# Patient Record
Sex: FEMALE | Race: ASIAN | NOT HISPANIC OR LATINO | URBAN - METROPOLITAN AREA
[De-identification: names, ages, dates, MRNs, and addresses within clinical notes are randomized per-mention and may not be internally consistent; named-entity substitution may affect disease eponyms.]

---

## 2023-10-12 ENCOUNTER — EMERGENCY (EMERGENCY)
Facility: HOSPITAL | Age: 23
LOS: 1 days | Discharge: HOME | End: 2023-10-12
Attending: EMERGENCY MEDICINE | Admitting: EMERGENCY MEDICINE
Payer: COMMERCIAL

## 2023-10-12 VITALS
SYSTOLIC BLOOD PRESSURE: 138 MMHG | WEIGHT: 119.93 LBS | HEART RATE: 121 BPM | HEIGHT: 61 IN | DIASTOLIC BLOOD PRESSURE: 83 MMHG | OXYGEN SATURATION: 100 % | RESPIRATION RATE: 22 BRPM

## 2023-10-12 VITALS
DIASTOLIC BLOOD PRESSURE: 65 MMHG | OXYGEN SATURATION: 97 % | TEMPERATURE: 98 F | SYSTOLIC BLOOD PRESSURE: 111 MMHG | RESPIRATION RATE: 15 BRPM | HEART RATE: 76 BPM

## 2023-10-12 PROCEDURE — 99284 EMERGENCY DEPT VISIT MOD MDM: CPT

## 2023-10-12 PROCEDURE — 96374 THER/PROPH/DIAG INJ IV PUSH: CPT

## 2023-10-12 PROCEDURE — 96375 TX/PRO/DX INJ NEW DRUG ADDON: CPT

## 2023-10-12 PROCEDURE — 99284 EMERGENCY DEPT VISIT MOD MDM: CPT | Mod: 25

## 2023-10-12 RX ORDER — DIPHENHYDRAMINE HCL 50 MG
50 CAPSULE ORAL ONCE
Refills: 0 | Status: COMPLETED | OUTPATIENT
Start: 2023-10-12 | End: 2023-10-12

## 2023-10-12 RX ORDER — FAMOTIDINE 10 MG/ML
20 INJECTION INTRAVENOUS ONCE
Refills: 0 | Status: COMPLETED | OUTPATIENT
Start: 2023-10-12 | End: 2023-10-12

## 2023-10-12 RX ORDER — SODIUM CHLORIDE 9 MG/ML
500 INJECTION INTRAMUSCULAR; INTRAVENOUS; SUBCUTANEOUS ONCE
Refills: 0 | Status: COMPLETED | OUTPATIENT
Start: 2023-10-12 | End: 2023-10-12

## 2023-10-12 RX ADMIN — SODIUM CHLORIDE 500 MILLILITER(S): 9 INJECTION INTRAMUSCULAR; INTRAVENOUS; SUBCUTANEOUS at 21:50

## 2023-10-12 RX ADMIN — FAMOTIDINE 20 MILLIGRAM(S): 10 INJECTION INTRAVENOUS at 21:25

## 2023-10-12 RX ADMIN — Medication 125 MILLIGRAM(S): at 21:26

## 2023-10-12 RX ADMIN — Medication 50 MILLIGRAM(S): at 21:25

## 2023-10-12 RX ADMIN — SODIUM CHLORIDE 1000 MILLILITER(S): 9 INJECTION INTRAMUSCULAR; INTRAVENOUS; SUBCUTANEOUS at 21:25

## 2023-10-12 NOTE — ED PROVIDER NOTE - CARE PROVIDER_API CALL
Errol Geronimo  Allergy and Immunology  [address], [address]  [place] [address]  Phone: [phone]  Fax: [phone]  Follow Up Time:

## 2023-10-12 NOTE — ED PROVIDER NOTE - OBJECTIVE STATEMENT
[place] [employer] [place] and developed diffuse itching and sensation of her tongue swelling.  Patient did not take any medications and came here.  No shortness of breath.  No difficulty speaking or swallowing.  Patient had eaten some chicken nuggets about an hour before the onset of symptoms.  Patient has no known allergies and is taking only vitamin D which she has been on for a while.  No other new exposures.

## 2023-10-12 NOTE — ED ADULT NURSE NOTE - NSFALLUNIVINTERV_ED_ALL_ED
Bed/Stretcher in lowest position, wheels locked, appropriate side rails in place/Call bell, personal items and telephone in reach/Instruct patient to call for assistance before getting out of bed/chair/stretcher/Non-slip footwear applied when patient is off stretcher/[place] to call system/Physically safe environment - no spills, clutter or unnecessary equipment/Purposeful proactive rounding/Room/bathroom lighting operational, light cord in reach

## 2023-10-12 NOTE — ED ADULT NURSE NOTE - OBJECTIVE STATEMENT
pt comes to ed c/o sudden on set allergic reaction to entire body + hives, + redness, + itchiness, with tongue and lip swelling. pt denies CP, sob, difficulty speaking or swallowing. no known allergic reaction in the past. had some chicken nuggets 1 hour before onset of symptoms.

## 2023-10-12 NOTE — ED PROVIDER NOTE - PATIENT PORTAL LINK FT
You can access the FollowMyHealth Patient Portal offered by [facility] by registering at the following website: [email or web address]. By joining [facility]’s FollowMyHealth portal, you will also be able to view your health information using other applications (apps) compatible with our system.

## 2023-10-12 NOTE — ED ADULT NURSE NOTE - IS THE PATIENT ABLE TO BE SCREENED?
acetaminophen 325 mg oral tablet: 2 tab(s) orally every 6 hours, As needed, Temp greater or equal to 38C (100.4F), Mild Pain (1 - 3)  Alogliptin 25 mg oral tablet: 1 tab(s) orally once a day  aspirin 81 mg oral tablet: 1 tab(s) orally once a day  atorvastatin 20 mg oral tablet: 1 tab(s) orally once a day  carvedilol 25 mg oral tablet: 1 tab(s) orally 2 times a day  CellCept 500 mg oral tablet: 2 tab(s) orally 2 times a day  CMV PCR: 1 each  every 7 days x 2   Lab results to be sent to PMD   folic acid 1 mg oral tablet: 1 tab(s) orally once a day  isosorbide mononitrate 30 mg oral tablet, extended release: 1 tab(s) orally once a day (in the morning)  Lantus 100 units/mL subcutaneous solution: 12 unit(s) subcutaneous once a day  magnesium hydroxide 400 mg oral tablet, chewable: 1 tab(s) orally 2 times a day  metFORMIN 500 mg oral tablet, extended release: 500 tab(s) orally once a day (at bedtime)  NovoLOG 100 units/mL subcutaneous solution: 6 unit(s) subcutaneous 3 times a day (before meals)  predniSONE 2.5 mg oral tablet: 1 tab(s) orally once a day  Prograf 0.5 mg oral capsule: 1 cap(s) orally once a day (at bedtime)  Prograf 1 mg oral capsule: 1 cap(s) orally once a day  valGANciclovir 450 mg oral tablet: 1 tab(s) orally 2 times a day x 14 days  Vitamin D3 2000 intl units (50 mcg) oral tablet: 1 tab(s) orally once a day   Yes

## 2023-10-12 NOTE — ED PROVIDER NOTE - CLINICAL SUMMARY MEDICAL DECISION MAKING FREE TEXT BOX
Patient with sudden allergic reaction of unknown etiology.  Will treat with antihistamines and steroids.  Will observe in the emergency department.  If improves will refer to allergist for outpatient follow-up.

## 2023-10-12 NOTE — ED ADULT NURSE REASSESSMENT NOTE - NS ED NURSE REASSESS COMMENT FT1
pt resting comfortably, improvement noted, in no acute distress. Respirations are even and unlabored. pt voices no complaints at this time. plan of care ongoing, no further concerns as of present, pt hemodynamically stable. no acute changes noted, needs attended to, safety maintained, call bell within reach. O-L Flap Text: The defect edges were debeveled with a #15 scalpel blade. Given the location of the defect, shape of the defect and the proximity to free margins an O-L flap was deemed most appropriate. Using a sterile surgical marker, an appropriate advancement flap was drawn incorporating the defect and placing the expected incisions within the relaxed skin tension lines where possible. The area thus outlined was incised deep to adipose tissue with a #15 scalpel blade. The skin margins were undermined to an appropriate distance in all directions utilizing iris scissors. Following this, the designed flap was advanced and carried over into the primary defect and sutured into place.